# Patient Record
Sex: FEMALE | URBAN - METROPOLITAN AREA
[De-identification: names, ages, dates, MRNs, and addresses within clinical notes are randomized per-mention and may not be internally consistent; named-entity substitution may affect disease eponyms.]

---

## 2023-11-09 ENCOUNTER — OFFICE VISIT (OUTPATIENT)
Dept: URBAN - METROPOLITAN AREA CLINIC 80 | Facility: CLINIC | Age: 23
End: 2023-11-09
Payer: COMMERCIAL

## 2023-11-09 ENCOUNTER — DASHBOARD ENCOUNTERS (OUTPATIENT)
Age: 23
End: 2023-11-09

## 2023-11-09 VITALS
SYSTOLIC BLOOD PRESSURE: 108 MMHG | HEART RATE: 84 BPM | DIASTOLIC BLOOD PRESSURE: 74 MMHG | WEIGHT: 118 LBS | BODY MASS INDEX: 20.91 KG/M2 | TEMPERATURE: 97.7 F | HEIGHT: 63 IN

## 2023-11-09 DIAGNOSIS — R10.9 ABDOMINAL CRAMPING: ICD-10-CM

## 2023-11-09 DIAGNOSIS — K58.1 IRRITABLE BOWEL SYNDROME WITH CONSTIPATION: ICD-10-CM

## 2023-11-09 PROBLEM — 440630006: Status: ACTIVE | Noted: 2023-11-09

## 2023-11-09 PROCEDURE — 99204 OFFICE O/P NEW MOD 45 MIN: CPT | Performed by: PHYSICIAN ASSISTANT

## 2023-11-09 NOTE — HPI-TODAY'S VISIT:
Pt states she has had GI issues since high school - bloating, gas - found out she was lactose intollerant and has not had dairy in years - helped a lot but never solved everything  in last 2 years the symptoms have changed and gotten worse she gets very constipated - can go up to a week without a BM tried Gas X, Miralax daily and a Miralax cleanse - no real help other than solving that day most concerning issue is when she does have a BM she has severe abd pain - cramping and twisting - she has vomited multiple times because the pain is so bad - hands and feet get numb, feels she may pass out but never has this is everytime she has a BM in past her normal was 1 BM q2-3 days no brbpr or melena stool looks normal to her and at times by the end of the bm it may be diarrhea abd pain gets better after she goes - the pain comes in waves and she will have to sit in restroom for an hour until the waves of pain and cramping stop no fevers or chills gas build up more because constipated abd tenderness and distended a lot

## 2023-11-13 LAB
A/G RATIO: 1.7
ALBUMIN: 4.5
ALKALINE PHOSPHATASE: 48
ALT (SGPT): 15
ANCA SCREEN: NEGATIVE
AST (SGOT): 20
BILIRUBIN, TOTAL: 0.7
BUN/CREATININE RATIO: (no result)
BUN: 12
C-REACTIVE PROTEIN, QUANT: 1.5
CALCIUM: 10
CARBON DIOXIDE, TOTAL: 25
CHLORIDE: 103
CREATININE: 0.72
EGFR: 120
GASCA: 9.8
GLOBULIN, TOTAL: 2.6
GLUCOSE: 87
HEMATOCRIT: 44.8
HEMOGLOBIN: 15.3
IMMUNOGLOBULIN A, QN, SERUM: 69
MCH: 30.6
MCHC: 34.2
MCV: 89.6
MPV: 11.9
MYELOPEROXIDASE ANTIBODY: <1
PLATELET COUNT: 291
POTASSIUM: 5
PROTEIN, TOTAL: 7.1
PROTEINASE-3 ANTIBODY: <1
RDW: 11.7
RED BLOOD CELL COUNT: 5
SACCHAROMYCES CEREVISIAE AB (ASCA) (IGA): 4.4
SODIUM: 137
T-TRANSGLUTAMINASE (TTG) IGA: <1
TSH W/REFLEX TO FT4: 2.44
WHITE BLOOD CELL COUNT: 6.3

## 2023-11-27 ENCOUNTER — WEB ENCOUNTER (OUTPATIENT)
Dept: URBAN - METROPOLITAN AREA CLINIC 80 | Facility: CLINIC | Age: 23
End: 2023-11-27

## 2023-11-27 RX ORDER — TENAPANOR HYDROCHLORIDE 53.2 MG/1
1 TABLET IMMEDIATELY BEFORE MEALS TABLET ORAL TWICE A DAY
Qty: 180 TABLET | Refills: 3 | OUTPATIENT
Start: 2023-11-28 | End: 2024-11-22

## 2023-11-28 ENCOUNTER — WEB ENCOUNTER (OUTPATIENT)
Dept: URBAN - METROPOLITAN AREA CLINIC 80 | Facility: CLINIC | Age: 23
End: 2023-11-28

## 2023-11-28 ENCOUNTER — TELEPHONE ENCOUNTER (OUTPATIENT)
Dept: URBAN - METROPOLITAN AREA CLINIC 96 | Facility: CLINIC | Age: 23
End: 2023-11-28

## 2023-12-01 ENCOUNTER — TELEPHONE ENCOUNTER (OUTPATIENT)
Dept: URBAN - METROPOLITAN AREA CLINIC 96 | Facility: CLINIC | Age: 23
End: 2023-12-01

## 2023-12-21 ENCOUNTER — WEB ENCOUNTER (OUTPATIENT)
Dept: URBAN - METROPOLITAN AREA CLINIC 80 | Facility: CLINIC | Age: 23
End: 2023-12-21

## 2024-01-07 ENCOUNTER — WEB ENCOUNTER (OUTPATIENT)
Dept: URBAN - METROPOLITAN AREA CLINIC 80 | Facility: CLINIC | Age: 24
End: 2024-01-07

## 2024-01-08 ENCOUNTER — WEB ENCOUNTER (OUTPATIENT)
Dept: URBAN - METROPOLITAN AREA CLINIC 80 | Facility: CLINIC | Age: 24
End: 2024-01-08

## 2024-01-22 ENCOUNTER — WEB ENCOUNTER (OUTPATIENT)
Dept: URBAN - METROPOLITAN AREA CLINIC 80 | Facility: CLINIC | Age: 24
End: 2024-01-22

## 2025-02-04 ENCOUNTER — WEB ENCOUNTER (OUTPATIENT)
Dept: URBAN - METROPOLITAN AREA CLINIC 80 | Facility: CLINIC | Age: 25
End: 2025-02-04

## 2025-02-04 RX ORDER — PLECANATIDE 3 MG/1
1 TABLET TABLET ORAL ONCE A DAY
Qty: 90 TABLET | Refills: 0
Start: 2024-02-02 | End: 2025-05-06

## 2025-03-03 ENCOUNTER — OFFICE VISIT (OUTPATIENT)
Dept: URBAN - METROPOLITAN AREA CLINIC 80 | Facility: CLINIC | Age: 25
End: 2025-03-03
Payer: COMMERCIAL

## 2025-03-03 VITALS
WEIGHT: 124.2 LBS | SYSTOLIC BLOOD PRESSURE: 92 MMHG | DIASTOLIC BLOOD PRESSURE: 61 MMHG | BODY MASS INDEX: 22.01 KG/M2 | TEMPERATURE: 97.9 F | HEART RATE: 84 BPM | HEIGHT: 63 IN

## 2025-03-03 DIAGNOSIS — K58.1 IRRITABLE BOWEL SYNDROME WITH CONSTIPATION: ICD-10-CM

## 2025-03-03 PROCEDURE — 99213 OFFICE O/P EST LOW 20 MIN: CPT | Performed by: PHYSICIAN ASSISTANT

## 2025-03-03 RX ORDER — PLECANATIDE 3 MG/1
1 TABLET TABLET ORAL ONCE A DAY
Qty: 90 TABLET | Refills: 0 | Status: ACTIVE | COMMUNITY
Start: 2024-02-02 | End: 2025-05-06

## 2025-03-03 RX ORDER — ESCITALOPRAM 20 MG/1
TAKE 1 TABLET BY MOUTH EVERY DAY TABLET, FILM COATED ORAL
Qty: 90 EACH | Refills: 0 | Status: ACTIVE | COMMUNITY

## 2025-03-03 RX ORDER — ESCITALOPRAM OXALATE 5 MG/1
TAKE 1 TABLET BY MOUTH DAILY TABLET ORAL
Qty: 90 EACH | Refills: 0 | Status: ACTIVE | COMMUNITY

## 2025-03-03 RX ORDER — PLECANATIDE 3 MG/1
1 TABLET TABLET ORAL ONCE A DAY
Qty: 90 TABLET | Refills: 3 | OUTPATIENT
Start: 2025-03-03

## 2025-03-03 NOTE — HPI-ZZZTODAY'S VISIT
Patient came for follow-up of her constipation.  She is doing really well on Trulance 1 pill a day.   Having 1 bowel movement every other day.   No bright red blood per rectum.  No melena.  No nausea or vomiting.  No abdominal pain.   Overall she is feeling great.